# Patient Record
Sex: FEMALE | Race: WHITE | NOT HISPANIC OR LATINO | Employment: OTHER | URBAN - METROPOLITAN AREA
[De-identification: names, ages, dates, MRNs, and addresses within clinical notes are randomized per-mention and may not be internally consistent; named-entity substitution may affect disease eponyms.]

---

## 2017-01-13 ENCOUNTER — TELEPHONE (OUTPATIENT)
Dept: RHEUMATOLOGY | Facility: CLINIC | Age: 77
End: 2017-01-13

## 2017-01-13 NOTE — TELEPHONE ENCOUNTER
Returned patient's call. Rescheduled follow up for march 2017. Patient was supposed to be scheduled for 3 month follow up

## 2017-01-13 NOTE — TELEPHONE ENCOUNTER
----- Message from Samantha Ontiveros sent at 1/13/2017 12:34 PM CST -----  Contact: Patient  Patient was understand why her appointment wasn't scheduled for March. Please call patient at [phone] with a sooner appointment than May.

## 2017-05-30 ENCOUNTER — OFFICE VISIT (OUTPATIENT)
Dept: PODIATRY | Facility: CLINIC | Age: 77
End: 2017-05-30
Payer: MEDICARE

## 2017-05-30 VITALS — HEIGHT: 66 IN | BODY MASS INDEX: 32.92 KG/M2 | WEIGHT: 204.81 LBS

## 2017-05-30 DIAGNOSIS — M20.41 HAMMER TOES OF BOTH FEET: ICD-10-CM

## 2017-05-30 DIAGNOSIS — E11.49 TYPE II DIABETES MELLITUS WITH NEUROLOGICAL MANIFESTATIONS: Primary | ICD-10-CM

## 2017-05-30 DIAGNOSIS — L85.3 XEROSIS OF SKIN: ICD-10-CM

## 2017-05-30 DIAGNOSIS — L84 PRE-ULCERATIVE CALLUSES: ICD-10-CM

## 2017-05-30 DIAGNOSIS — M21.42 PES PLANUS OF BOTH FEET: ICD-10-CM

## 2017-05-30 DIAGNOSIS — M20.42 HAMMER TOES OF BOTH FEET: ICD-10-CM

## 2017-05-30 DIAGNOSIS — M79.671 FOOT PAIN, RIGHT: ICD-10-CM

## 2017-05-30 DIAGNOSIS — M21.41 PES PLANUS OF BOTH FEET: ICD-10-CM

## 2017-05-30 PROCEDURE — 99499 UNLISTED E&M SERVICE: CPT | Mod: S$GLB,,, | Performed by: PODIATRIST

## 2017-05-30 PROCEDURE — 99999 PR PBB SHADOW E&M-EST. PATIENT-LVL III: CPT | Mod: PBBFAC,,, | Performed by: PODIATRIST

## 2017-05-30 RX ORDER — BUTALBITAL, ACETAMINOPHEN AND CAFFEINE 50; 325; 40 MG/1; MG/1; MG/1
1 TABLET ORAL DAILY PRN
COMMUNITY
End: 2017-10-17

## 2017-05-30 NOTE — PROGRESS NOTES
Subjective:      Patient ID: Johanny Hull is a 76 y.o. female.    Chief Complaint: Heel Pain (left foot); Callouses (right foot 2nd-3rd toes/ Hammer toes); and Diabetes Mellitus (Shari-3/17   A1C- unknown per patient)    Johanny is a 76 y.o. female who presents to the clinic for routine evaluation and treatment of diabetic feet. Johanny has a past medical history of Arthritis; Cataract; Diabetes mellitus; GERD (gastroesophageal reflux disease); Hyperlipidemia; Hypertension; Pacemaker; and Thyroid disease. Patient's chief complaint concerns calluses to the Rt. 2nd and 3rd toes.  States that they have been present since our last encounter.  Relates emitting sharp pain and has been concerned by their potential to ulcerate.  Currently rates pain as a 7/10.  Symptoms are exacerbated with wearing closed toe shoes.  Continues to wear a pair of leather Elvi Kevin with a shallow toe box.  Symptoms are alleviated with shoe removal.  Denies any additional pedal complaints.      PCP: Isaac Mccarthy MD    Date Last Seen by PCP: 3/17    No results found for: HGBA1C    Past Medical History:   Diagnosis Date    Arthritis     Cataract     Diabetes mellitus     GERD (gastroesophageal reflux disease)     Hyperlipidemia     Hypertension     Pacemaker     Thyroid disease        Past Surgical History:   Procedure Laterality Date    APPENDECTOMY      CARDIAC PACEMAKER PLACEMENT      10 /2016    CATARACT EXTRACTION      CERVICAL FUSION      CHOLECYSTECTOMY      CORONARY STENT PLACEMENT  x3    may 2015, aug 2015    EYE SURGERY      RIGHT    FOOT SURGERY      x 2 LEFT    HYSTERECTOMY      VAGINAL    JOINT REPLACEMENT      R knee, L knee, R hip    LAMINECTOMY LUMBAR SPLINE W/ PLACEMENT SPINAL CORD STIMULATOR      LUMBAR LAMINECTOMY      x 6    SHOULDER SURGERY      ROTOR CUFF SU    SMALL INTESTINE SURGERY      3 blockages, adhesions       Family History   Problem Relation Age of Onset    Cancer Mother      LIVER     Stroke Father        Social History     Social History    Marital status:      Spouse name: N/A    Number of children: N/A    Years of education: N/A     Social History Main Topics    Smoking status: Never Smoker    Smokeless tobacco: None    Alcohol use No    Drug use: No    Sexual activity: Not Currently     Other Topics Concern    None     Social History Narrative    None       Current Outpatient Prescriptions   Medication Sig Dispense Refill    acetaminophen (TYLENOL) 500 MG tablet Take 500 mg by mouth every 6 (six) hours as needed.        amitriptyline (ELAVIL) 10 MG tablet Take 1 tablet (10 mg total) by mouth every evening. 30 tablet 11    butalbital-acetaminop-caf-cod -11-30 mg Cap Take 1 tablet by mouth 3 (three) times daily as needed. 60 capsule 3    butalbital-acetaminophen-caffeine -40 mg (FIORICET, ESGIC) -40 mg per tablet Take by mouth.      dicyclomine (BENTYL) 10 MG capsule Take 10 mg by mouth 4 (four) times daily before meals and nightly.        folic acid (FOLVITE) 1 MG tablet Take 1 mg by mouth once daily.        gabapentin (NEURONTIN) 300 MG capsule       lansoprazole (PREVACID) 30 MG capsule Take 30 mg by mouth once daily.        levothyroxine (SYNTHROID) 25 MCG tablet Take 25 mcg by mouth once daily.        metformin (GLUCOPHAGE) 500 MG tablet       metoprolol succinate (TOPROL-XL) 25 MG 24 hr tablet Take 25 mg by mouth once daily.        omeprazole (PRILOSEC) 20 MG capsule Take 20 mg by mouth once daily.      pravastatin (PRAVACHOL) 10 MG tablet Take 10 mg by mouth once daily.        predniSONE (DELTASONE) 2.5 MG tablet Take 2 tablets (5 mg total) by mouth once daily. 60 tablet 4    ranitidine (ZANTAC) 75 MG tablet Take 75 mg by mouth 2 (two) times daily.        rivaroxaban (XARELTO) 20 mg Tab Take 20 mg by mouth daily with dinner or evening meal.      tramadol (ULTRAM) 50 mg tablet Take 1 tablet (50 mg total) by mouth 3 (three) times  daily as needed for Pain. 90 tablet 3    acetaminophen-codeine 300-60mg (TYLENOL #4) 300-60 mg Tab TK 1 T PO  Q 8 H PRN  0     No current facility-administered medications for this visit.        Review of patient's allergies indicates:   Allergen Reactions    Cephalosporins     Ciprofloxacin     Cleocin [clindamycin hcl]     Darvon [propoxyphene]     Demerol [meperidine]     Doxycycline     Erythromycin     Macrodantin [nitrofurantoin macrocrystalline]     Norco [hydrocodone-acetaminophen]     Other omega-3s Nausea And Vomiting     Cepabid    Percocet [oxycodone-acetaminophen]     Stadol [butorphanol tartrate]     Sulfa (sulfonamide antibiotics)     Tigan [trimethobenzamide]          Review of Systems   Constitution: Negative for chills and fever.   Cardiovascular: Negative for claudication.   Skin: Positive for color change, dry skin and nail changes.   Musculoskeletal: Positive for arthritis, joint pain and joint swelling.   Neurological: Positive for paresthesias. Negative for numbness.   Psychiatric/Behavioral: Negative for altered mental status.           Objective:      Physical Exam   Constitutional: She is oriented to person, place, and time. She appears well-developed and well-nourished. No distress.   Cardiovascular:   Pulses:       Dorsalis pedis pulses are 2+ on the right side, and 2+ on the left side.        Posterior tibial pulses are 1+ on the right side, and 1+ on the left side.   CFT <3 seconds bilateral.  Pedal hair growth decreased bilateral.  Varicosities noted bilateral.  1+ pitting edema bilateral.  Toes are cool to touch bilateral.     Musculoskeletal: She exhibits edema. She exhibits no tenderness.   Muscle strength 5/5 in all muscle groups bilateral.  No tenderness nor crepitation with ROM of foot/ankle joints bilateral.  No pain with palpation of bilateral foot and ankle.  Bilateral gastrocnemius equinus.  Bilateral pes planus foot type.  Bilateral hallux abducto valgus.   Bilateral semi-rigid contracture of toes 2-5.     Neurological: She is alert and oriented to person, place, and time. She has normal strength. A sensory deficit is present.   Protective sensation per [place] monofilament intact bilateral.    Vibratory sensation decreased bilateral.    Light touch intact bilateral.   Skin: Skin is warm, dry and intact. No abrasion, no bruising, no burn, no ecchymosis, no laceration, no lesion, no petechiae and no rash noted. She is not diaphoretic. No cyanosis or erythema. No pallor. Nails show no clubbing.   Xerosis noted to the Lt. Medial and posterior heel.  Pedal skin appears edematous bilateral. Toenails x 10 appear mycotic but well maintained.   Pre-ulcerative callus noted to the Rt. 2nd PIP joint dorsally. Taunt bulla noted to the Rt. 3rd PIP joint.  No adjacent signs of infection noted.  Examination of the skin reveals no evidence of significant maceration, rashes, open lesions, suspicious appearing nevi or other concerning lesions.              Assessment:       Encounter Diagnoses   Name Primary?    Type II diabetes mellitus with neurological manifestations Yes    Foot pain, right     Pes planus of both feet     Hammer toes of both feet     Xerosis of skin     Pre-ulcerative calluses          Plan:       Johanny was seen today for heel pain, callouses and diabetes mellitus.    Diagnoses and all orders for this visit:    Type II diabetes mellitus with neurological manifestations  -     DIABETIC SHOES FOR HOME USE    Foot pain, right  -     DIABETIC SHOES FOR HOME USE    Pes planus of both feet  -     DIABETIC SHOES FOR HOME USE    Hammer toes of both feet  -     DIABETIC SHOES FOR HOME USE    Xerosis of skin    Pre-ulcerative calluses  -     DIABETIC SHOES FOR HOME USE      I counseled the patient on her conditions, their implications and medical management.    Explained to patient that current irritation to the toes is in fact solely due to wearing shoes with  an inappropriate fitting toe box.  Advised to wear more of an open toe shoe until new diabetic shoe prescription is filled.    Prescription written for diabetic shoes to offload digital deformities.  Recommend that new shoes have a toe box comprised of Lycra to reduce irritation to the dorsum of contracted digits.    Advised to use Excipial cream with 20% urea to address xerosis and to reduce callus build up.      Shoe inspection. Diabetic Foot Education. Patient reminded of the importance of good nutrition and blood sugar control to help prevent podiatric complications of diabetes. Patient instructed on proper foot hygeine. We discussed wearing proper shoe gear, daily foot inspections, never walking without protective shoe gear, never putting sharp instruments to feet    With the patient's verbal consent, a sterile #15 scalpel was used to trim the lesion of the Rt. 2nd toe down to smooth appearing skin without incident.  Patient tolerated this quite well.  Patient instructed to inspect her feet, wear protective shoe gear when ambulatory, moisturizer to maintain skin integrity and follow in this office in approximately 2-3 months, sooner p.r.n.    Return in about 3 months (around 8/30/2017).    Skinny Pierre DPM

## 2017-06-02 ENCOUNTER — TELEPHONE (OUTPATIENT)
Dept: PODIATRY | Facility: CLINIC | Age: 77
End: 2017-06-02

## 2017-06-02 NOTE — TELEPHONE ENCOUNTER
Spoke with patient, /  out, Patient states she will go to PCP to have her toe checked, will let office know how things go.

## 2017-06-02 NOTE — TELEPHONE ENCOUNTER
----- Message from Rick Harrison sent at 6/2/2017  8:06 AM CDT -----  Contact: Patient  Patient states that she had her second toe on the right foot trimmed and now it's yellow and red, but it's not bleeding.  Please call [phone].  Thank you

## 2017-06-06 ENCOUNTER — HOSPITAL ENCOUNTER (OUTPATIENT)
Dept: RADIOLOGY | Facility: HOSPITAL | Age: 77
Discharge: HOME | End: 2017-06-06
Attending: PODIATRIST
Payer: MEDICARE

## 2017-06-06 ENCOUNTER — OFFICE VISIT (OUTPATIENT)
Dept: PODIATRY | Facility: CLINIC | Age: 77
End: 2017-06-06
Payer: MEDICARE

## 2017-06-06 VITALS — WEIGHT: 204 LBS | HEIGHT: 66 IN | BODY MASS INDEX: 32.78 KG/M2

## 2017-06-06 DIAGNOSIS — E11.49 TYPE II DIABETES MELLITUS WITH NEUROLOGICAL MANIFESTATIONS: ICD-10-CM

## 2017-06-06 DIAGNOSIS — L97.512 ULCER OF GREAT TOE, RIGHT, WITH FAT LAYER EXPOSED: ICD-10-CM

## 2017-06-06 DIAGNOSIS — L97.512 ULCER OF GREAT TOE, RIGHT, WITH FAT LAYER EXPOSED: Primary | ICD-10-CM

## 2017-06-06 PROCEDURE — 99213 OFFICE O/P EST LOW 20 MIN: CPT | Mod: 25,S$GLB,, | Performed by: PODIATRIST

## 2017-06-06 PROCEDURE — 99999 PR PBB SHADOW E&M-EST. PATIENT-LVL III: CPT | Mod: PBBFAC,,, | Performed by: PODIATRIST

## 2017-06-06 PROCEDURE — 1159F MED LIST DOCD IN RCRD: CPT | Mod: S$GLB,,, | Performed by: PODIATRIST

## 2017-06-06 PROCEDURE — 73630 X-RAY EXAM OF FOOT: CPT | Mod: 26,RT,, | Performed by: RADIOLOGY

## 2017-06-06 PROCEDURE — 11042 DBRDMT SUBQ TIS 1ST 20SQCM/<: CPT | Mod: S$GLB,,, | Performed by: PODIATRIST

## 2017-06-06 PROCEDURE — 1125F AMNT PAIN NOTED PAIN PRSNT: CPT | Mod: S$GLB,,, | Performed by: PODIATRIST

## 2017-06-06 PROCEDURE — 73630 X-RAY EXAM OF FOOT: CPT | Mod: TC,PO,RT

## 2017-06-06 RX ORDER — BACITRACIN 500 [USP'U]/G
OINTMENT OPHTHALMIC
COMMUNITY
Start: 2017-06-02 | End: 2017-10-17

## 2017-06-06 RX ORDER — LEVOFLOXACIN 500 MG/1
TABLET, FILM COATED ORAL
COMMUNITY
Start: 2017-06-02 | End: 2017-10-17

## 2017-06-06 NOTE — PROGRESS NOTES
Subjective:      Patient ID: Johanny Hull is a 76 y.o. female.    Chief Complaint: Foot Pain (right foot, 2nd toe)    Wound 2nd toe right with cellulitis tx by Dr. Mccarthy.  Gradual onset, worsening over past several days, aggravated by increased weight bearing, shoe gear, pressure.  Taking levaquin with improvement past several days.  Denies trauma.    Review of Systems   Constitution: Negative for chills, fever, malaise/fatigue and night sweats.   Cardiovascular: Negative for claudication, cyanosis and leg swelling.   Skin: Positive for poor wound healing. Negative for color change, itching, rash and suspicious lesions.   Musculoskeletal: Negative for falls, gout, joint pain and myalgias.   Neurological: Positive for numbness and sensory change.           Objective:      Physical Exam   Constitutional: She is oriented to person, place, and time. She appears well-developed and well-nourished. She is cooperative.   Oriented to time, place, and person.   Cardiovascular:   Pulses:       Dorsalis pedis pulses are 1+ on the right side, and 1+ on the left side.        Posterior tibial pulses are 1+ on the right side, and 1+ on the left side.   Capillary fill time 3-5 seconds.  All toes warm to touch.      Negative lower extremity edema bilateral.    Negative elevational pallor and dependent rubor bilateral.     Musculoskeletal:   Normal angle, base, station of gait. Decreased stride length, early heel off, moderately propulsive toe off bilateral.    All ten toes without clubbing, cyanosis, or signs of ischemia.      Patient has hammertoes of digits   2-5 bilateral           not reducible.      No pain to palpation bilateral lower extremities.      Range of motion, stability, muscle strength, and muscle tone are age and health appropriate normal bilateral feet and legs.       Lymphadenopathy:   Negative lymphadenopathy bilateral popliteal fossa and tarsal tunnel.     Neurological: She is alert and oriented to person,  place, and time. She has normal strength. She is not disoriented. She displays no atrophy and no tremor. A sensory deficit is present. She exhibits normal muscle tone.   Reflex Scores:       Patellar reflexes are 2+ on the right side and 2+ on the left side.       Achilles reflexes are 2+ on the right side and 2+ on the left side.  Decreased/absent vibratory sensation bilateral feet to 128Hz tuning fork.    Diminished/loss of protective sensation all toes bilateral to 10 gram monofilament.     Skin: Skin is warm, dry and intact. No abrasion, no bruising, no burn, no ecchymosis, no laceration, no lesion, no petechiae and no rash noted. She is not diaphoretic. No cyanosis or erythema. No pallor. Nails show no clubbing.   Wound: dorsal pipj2 right    Size:    Pre:[id number]  Fluctuant callus  Post: [id number]    Base:  Granular, pink with moderate serous/serosanaguinous drainage only.  No pus, tracking, fluctuance, malodor, or cardinal signs infection.    Borders:  Hyperkeratotic, debriding to flat, pink, blanchable skin edges without undermining.          Skin thin, atrophic, with decreased density and distribution of pedal hair bilateral, but without hyperpigmentation, sherry discoloration,  ulcers, masses, nodules or cords palpated bilateral feet and legs.                   Assessment:       Encounter Diagnoses   Name Primary?    Ulcer of great toe, right, with fat layer exposed Yes    Type II diabetes mellitus with neurological manifestations          Plan:       Johanny was seen today for foot pain.    Diagnoses and all orders for this visit:    Ulcer of great toe, right, with fat layer exposed  -     X-Ray Foot Complete Right; Future  -     Aerobic culture  -     Culture, Anaerobic    Type II diabetes mellitus with neurological manifestations  -     X-Ray Foot Complete Right; Future  -     Aerobic culture  -     Culture, Anaerobic      I counseled the patient on her conditions, their implications and medical  management.        Debrided wound.    Dressed with wound foam, kerlix - change same every other day.    Dispensed sx shoe - ambulate minimally sx shoe right, DM shoes/inserts left.    Continue levaquin.    Cultures, xrays.          Return in about 1 week (around 6/13/2017) for wound 2nd toeright.

## 2017-06-06 NOTE — LETTER
June 6, 2017      Isaac Mccarthy MD  [address]  [facility] [address]           [facility]  [address] [facility] [address] [place] [address]  Phone: [phone]          Patient: Johanny Hull   MR Number: [id number]   YOB: 1940   Date of Visit: 6/6/2017       Dear Dr. Isaac Mccarthy:    Thank you for referring Johanny Hull to me for evaluation. Attached you will find relevant portions of my assessment and plan of care.    If you have questions, please do not hesitate to call me. I look forward to following Johanny Hull along with you.    Sincerely,    Jason Castanon, JENNIFER    Enclosure  CC:  No Recipients    If you would like to receive this communication electronically, please contact [email or web address] or [phone] to request more information on [employer] access.    For providers and/or their staff who would like to refer a patient to [facility], please contact us through our one-stop-shop provider referral line, [facility] , at [phone].    If you feel you have received this communication in error or would no longer like to receive these types of communications, please e-mail [email or web address]

## 2017-06-09 LAB — BACTERIA SPEC AEROBE CULT: NO GROWTH

## 2017-06-12 ENCOUNTER — OFFICE VISIT (OUTPATIENT)
Dept: PODIATRY | Facility: CLINIC | Age: 77
End: 2017-06-12
Payer: MEDICARE

## 2017-06-12 VITALS — HEIGHT: 66 IN | BODY MASS INDEX: 32.78 KG/M2 | WEIGHT: 203.94 LBS

## 2017-06-12 DIAGNOSIS — E11.49 TYPE II DIABETES MELLITUS WITH NEUROLOGICAL MANIFESTATIONS: ICD-10-CM

## 2017-06-12 DIAGNOSIS — M20.41 HAMMER TOES OF BOTH FEET: ICD-10-CM

## 2017-06-12 DIAGNOSIS — M20.42 HAMMER TOES OF BOTH FEET: ICD-10-CM

## 2017-06-12 DIAGNOSIS — Z87.2 HEALED ULCER OF RIGHT FOOT ON EXAMINATION: Primary | ICD-10-CM

## 2017-06-12 LAB — BACTERIA SPEC ANAEROBE CULT: NORMAL

## 2017-06-12 PROCEDURE — 99499 UNLISTED E&M SERVICE: CPT | Mod: S$GLB,,, | Performed by: PODIATRIST

## 2017-06-12 PROCEDURE — 99999 PR PBB SHADOW E&M-EST. PATIENT-LVL III: CPT | Mod: PBBFAC,,, | Performed by: PODIATRIST

## 2017-06-13 NOTE — PROGRESS NOTES
Subjective:      Patient ID: Johanny Hull is a 77 y.o. female.    Chief Complaint: Wound Care    Patient presents to clinic for a 1 week wound check of the Rt. Foot.  States the Rt. 2nd toe appears to be healed with today's exam.  Has been keeping the toe exposed to air.   Relates keeping the site offloaded with wearing sandals.  Denies being painful on exam.  Denies any additional pedal complaints.         Past Medical History:   Diagnosis Date    Arthritis     Cataract     Diabetes mellitus     GERD (gastroesophageal reflux disease)     Hyperlipidemia     Hypertension     Pacemaker     Thyroid disease        Past Surgical History:   Procedure Laterality Date    APPENDECTOMY      CARDIAC PACEMAKER PLACEMENT      10 /2016    CATARACT EXTRACTION      CERVICAL FUSION      CHOLECYSTECTOMY      CORONARY STENT PLACEMENT  x3    may 2015, aug 2015    EYE SURGERY      RIGHT    FOOT SURGERY      x 2 LEFT    HYSTERECTOMY      VAGINAL    JOINT REPLACEMENT      R knee, L knee, R hip    LAMINECTOMY LUMBAR SPLINE W/ PLACEMENT SPINAL CORD STIMULATOR      LUMBAR LAMINECTOMY      x 6    SHOULDER SURGERY      ROTOR CUFF SU    SMALL INTESTINE SURGERY      3 blockages, adhesions       Family History   Problem Relation Age of Onset    Cancer Mother      LIVER    Stroke Father        Social History     Social History    Marital status:      Spouse name: N/A    Number of children: N/A    Years of education: N/A     Social History Main Topics    Smoking status: Never Smoker    Smokeless tobacco: None    Alcohol use No    Drug use: No    Sexual activity: Not Currently     Other Topics Concern    None     Social History Narrative    None       Current Outpatient Prescriptions   Medication Sig Dispense Refill    acetaminophen (TYLENOL) 500 MG tablet Take 500 mg by mouth every 6 (six) hours as needed.        acetaminophen-codeine 300-60mg (TYLENOL #4) 300-60 mg Tab TK 1 T PO  Q 8 H PRN  0     amitriptyline (ELAVIL) 10 MG tablet Take 1 tablet (10 mg total) by mouth every evening. 30 tablet 11    bacitracin (AK-TRACIN) ophthalmic ointment       butalbital-acetaminop-caf-cod -00-30 mg Cap Take 1 tablet by mouth 3 (three) times daily as needed. 60 capsule 3    butalbital-acetaminophen-caffeine -40 mg (FIORICET, ESGIC) -40 mg per tablet Take by mouth.      dicyclomine (BENTYL) 10 MG capsule Take 10 mg by mouth 4 (four) times daily before meals and nightly.        folic acid (FOLVITE) 1 MG tablet Take 1 mg by mouth once daily.        gabapentin (NEURONTIN) 300 MG capsule       lansoprazole (PREVACID) 30 MG capsule Take 30 mg by mouth once daily.        levoFLOXacin (LEVAQUIN) 500 MG tablet       levothyroxine (SYNTHROID) 25 MCG tablet Take 25 mcg by mouth once daily.        metformin (GLUCOPHAGE) 500 MG tablet       metoprolol succinate (TOPROL-XL) 25 MG 24 hr tablet Take 25 mg by mouth once daily.        omeprazole (PRILOSEC) 20 MG capsule Take 20 mg by mouth once daily.      pravastatin (PRAVACHOL) 10 MG tablet Take 10 mg by mouth once daily.        predniSONE (DELTASONE) 2.5 MG tablet Take 2 tablets (5 mg total) by mouth once daily. 60 tablet 4    ranitidine (ZANTAC) 75 MG tablet Take 75 mg by mouth 2 (two) times daily.        rivaroxaban (XARELTO) 20 mg Tab Take 20 mg by mouth daily with dinner or evening meal.      tramadol (ULTRAM) 50 mg tablet Take 1 tablet (50 mg total) by mouth 3 (three) times daily as needed for Pain. 90 tablet 3     No current facility-administered medications for this visit.        Review of patient's allergies indicates:   Allergen Reactions    Cephalosporins     Ciprofloxacin     Cleocin [clindamycin hcl]     Darvon [propoxyphene]     Demerol [meperidine]     Doxycycline     Erythromycin     Macrodantin [nitrofurantoin macrocrystalline]     Norco [hydrocodone-acetaminophen]     Other omega-3s Nausea And Vomiting     Cepabid     Percocet [oxycodone-acetaminophen]     Stadol [butorphanol tartrate]     Sulfa (sulfonamide antibiotics)     Tigan [trimethobenzamide]          Review of Systems   Constitution: Negative for chills and fever.   Cardiovascular: Negative for claudication.   Skin: Positive for color change, dry skin and nail changes.   Musculoskeletal: Positive for arthritis, joint pain and joint swelling.   Neurological: Positive for paresthesias. Negative for numbness.   Psychiatric/Behavioral: Negative for altered mental status.           Objective:      Physical Exam   Constitutional: She is oriented to person, place, and time. She appears well-developed and well-nourished. No distress.   Cardiovascular:   Pulses:       Dorsalis pedis pulses are 2+ on the right side, and 2+ on the left side.        Posterior tibial pulses are 1+ on the right side, and 1+ on the left side.   CFT <3 seconds bilateral.  Pedal hair growth decreased bilateral.  Varicosities noted bilateral.  1+ pitting edema bilateral.  Toes are cool to touch bilateral.     Musculoskeletal: She exhibits edema. She exhibits no tenderness.   Muscle strength 5/5 in all muscle groups bilateral.  No tenderness nor crepitation with ROM of foot/ankle joints bilateral.  No pain with palpation of bilateral foot and ankle.  Bilateral gastrocnemius equinus.  Bilateral pes planus foot type.  Bilateral hallux abducto valgus.  Bilateral semi-rigid contracture of toes 2-5.     Neurological: She is alert and oriented to person, place, and time. She has normal strength. A sensory deficit is present.   Protective sensation per [place] monofilament intact bilateral.    Vibratory sensation decreased bilateral.    Light touch intact bilateral.   Skin: Skin is warm, dry and intact. No abrasion, no bruising, no burn, no ecchymosis, no laceration, no lesion, no petechiae and no rash noted. She is not diaphoretic. No cyanosis or erythema. No pallor. Nails show no clubbing.   Xerosis  noted to the Lt. Medial and posterior heel.  Pedal skin appears edematous bilateral. Toenails x 10 appear mycotic but well maintained.   Resolved bulla overlying the Rt. 3rd PIP joint and mature epithelium noted to the Rt. 2nd PIP joint.  No adjacent signs of infection noted.               Assessment:       Encounter Diagnoses   Name Primary?    Healed ulcer of right foot on examination Yes    Hammer toes of both feet     Type II diabetes mellitus with neurological manifestations          Plan:       Johanny was seen today for wound care.    Diagnoses and all orders for this visit:    Healed ulcer of right foot on examination    Hammer toes of both feet    Type II diabetes mellitus with neurological manifestations      I counseled the patient on her conditions, their implications and medical management.    The previous Rt. 2nd toe wound has successfully healed.  Advised to cushion the joint with a padded bandage while waiting to receive diabetic shoes.    Patient to fill previous Rx for diabetic shoes.  Recommend that new shoes have a toe box comprised of Lycra to reduce irritation to the dorsum of contracted digits.    Advised to use Excipial cream with 20% urea to address xerosis and to reduce rate of callus build up.      Return in about 3 months (around 9/12/2017).    Skinny Pierre DPM

## 2017-07-28 DIAGNOSIS — M47.817 LUMBAR AND SACRAL ARTHRITIS: ICD-10-CM

## 2017-07-28 DIAGNOSIS — M48.061 SPINAL STENOSIS AT L4-L5 LEVEL: ICD-10-CM

## 2017-07-28 DIAGNOSIS — M54.2 CERVICALGIA: ICD-10-CM

## 2017-07-28 DIAGNOSIS — G43.909 MIGRAINE WITHOUT STATUS MIGRAINOSUS, NOT INTRACTABLE, UNSPECIFIED MIGRAINE TYPE: ICD-10-CM

## 2017-07-28 DIAGNOSIS — M79.7 FIBROMYALGIA: ICD-10-CM

## 2017-07-28 DIAGNOSIS — M54.6 PAIN IN THORACIC SPINE: ICD-10-CM

## 2017-07-31 RX ORDER — PREDNISONE 2.5 MG/1
TABLET ORAL
Qty: 60 TABLET | Refills: 0 | Status: SHIPPED | OUTPATIENT
Start: 2017-07-31 | End: 2017-10-05 | Stop reason: SDUPTHER

## 2017-10-05 DIAGNOSIS — M48.061 SPINAL STENOSIS AT L4-L5 LEVEL: ICD-10-CM

## 2017-10-05 DIAGNOSIS — M47.817 LUMBAR AND SACRAL ARTHRITIS: ICD-10-CM

## 2017-10-05 DIAGNOSIS — G43.909 MIGRAINE WITHOUT STATUS MIGRAINOSUS, NOT INTRACTABLE, UNSPECIFIED MIGRAINE TYPE: ICD-10-CM

## 2017-10-05 DIAGNOSIS — M79.7 FIBROMYALGIA: ICD-10-CM

## 2017-10-05 DIAGNOSIS — M54.6 PAIN IN THORACIC SPINE: ICD-10-CM

## 2017-10-05 DIAGNOSIS — M54.2 CERVICALGIA: ICD-10-CM

## 2017-10-05 RX ORDER — PREDNISONE 2.5 MG/1
TABLET ORAL
Qty: 60 TABLET | Refills: 3 | Status: SHIPPED | OUTPATIENT
Start: 2017-10-05 | End: 2018-03-24 | Stop reason: SDUPTHER

## 2017-10-17 ENCOUNTER — OFFICE VISIT (OUTPATIENT)
Dept: ORTHOPEDICS | Facility: CLINIC | Age: 77
End: 2017-10-17
Payer: MEDICARE

## 2017-10-17 VITALS
HEIGHT: 66 IN | HEART RATE: 81 BPM | SYSTOLIC BLOOD PRESSURE: 138 MMHG | WEIGHT: 205 LBS | DIASTOLIC BLOOD PRESSURE: 76 MMHG | BODY MASS INDEX: 32.95 KG/M2

## 2017-10-17 DIAGNOSIS — M75.51 SUBDELTOID BURSITIS OF RIGHT SHOULDER JOINT: Primary | ICD-10-CM

## 2017-10-17 PROCEDURE — 20610 DRAIN/INJ JOINT/BURSA W/O US: CPT | Mod: RT,,, | Performed by: ORTHOPAEDIC SURGERY

## 2017-10-17 PROCEDURE — 99213 OFFICE O/P EST LOW 20 MIN: CPT | Mod: 25,,, | Performed by: ORTHOPAEDIC SURGERY

## 2017-10-17 RX ORDER — TRIAMCINOLONE ACETONIDE 40 MG/ML
80 INJECTION, SUSPENSION INTRA-ARTICULAR; INTRAMUSCULAR
Status: DISCONTINUED | OUTPATIENT
Start: 2017-10-17 | End: 2017-10-17 | Stop reason: HOSPADM

## 2017-10-17 RX ORDER — ONDANSETRON 4 MG/1
1 TABLET, FILM COATED ORAL DAILY PRN
COMMUNITY
Start: 2017-08-24 | End: 2018-08-30

## 2017-10-17 RX ORDER — PANTOPRAZOLE SODIUM 40 MG/1
1 TABLET, DELAYED RELEASE ORAL DAILY
COMMUNITY
Start: 2017-08-31 | End: 2017-10-17

## 2017-10-17 RX ORDER — MUPIROCIN 20 MG/G
OINTMENT TOPICAL
COMMUNITY
Start: 2017-10-04 | End: 2018-08-30

## 2017-10-17 RX ORDER — MIDODRINE HYDROCHLORIDE 2.5 MG/1
1 TABLET ORAL 2 TIMES DAILY
COMMUNITY
Start: 2017-09-06 | End: 2017-10-17

## 2017-10-17 RX ORDER — NORTRIPTYLINE HYDROCHLORIDE 10 MG/1
10 CAPSULE ORAL NIGHTLY
COMMUNITY
End: 2018-08-30

## 2017-10-17 RX ORDER — PANTOPRAZOLE SODIUM 40 MG/1
40 TABLET, DELAYED RELEASE ORAL DAILY
COMMUNITY

## 2017-10-17 RX ORDER — DICYCLOMINE HYDROCHLORIDE 20 MG/1
1 TABLET ORAL 3 TIMES DAILY
COMMUNITY
Start: 2017-10-16

## 2017-10-17 RX ADMIN — TRIAMCINOLONE ACETONIDE 80 MG: 40 INJECTION, SUSPENSION INTRA-ARTICULAR; INTRAMUSCULAR at 05:10

## 2017-10-17 NOTE — PROGRESS NOTES
Subjective:       Chief Complaint    Chief Complaint   Patient presents with    Follow-up     right shoulder.  DOI: 2/2017 due to falling in a parking lot.  Patient is having pain and achy feeling.  She has a torn rotator cuff that has not been repaired.  Requesting an injection.       HPI  Johanny Hull is a 77 y.o.  female who presentsWith three-week history of progressive pain in the right shoulder. Only relief she gets  is with the injection, which is temporary.       Past History  Past Medical History:   Diagnosis Date    Arthritis     Cataract     Diabetes mellitus     Diabetes mellitus, type 2     GERD (gastroesophageal reflux disease)     Hyperlipidemia     Hypertension     Pacemaker     Severe low back pain     Subdeltoid bursitis of right shoulder joint     Thyroid disease      Past Surgical History:   Procedure Laterality Date    APPENDECTOMY      CARDIAC PACEMAKER PLACEMENT      10 /2016    CATARACT EXTRACTION      CERVICAL FUSION      CHOLECYSTECTOMY      CORONARY STENT PLACEMENT  x3    may 2015, aug 2015    EYE SURGERY      RIGHT    FOOT SURGERY      x 2 LEFT    HYSTERECTOMY      VAGINAL    JOINT REPLACEMENT      R knee, L knee, R hip    LAMINECTOMY LUMBAR SPLINE W/ PLACEMENT SPINAL CORD STIMULATOR      LUMBAR LAMINECTOMY      x 6    SHOULDER SURGERY      ROTOR CUFF SU    SMALL INTESTINE SURGERY      3 blockages, adhesions    TOTAL HIP ARTHROPLASTY Right     TOTAL KNEE ARTHROPLASTY Bilateral      Social History     Social History    Marital status:      Spouse name: N/A    Number of children: N/A    Years of education: N/A     Occupational History    Not on file.     Social History Main Topics    Smoking status: Never Smoker    Smokeless tobacco: Never Used    Alcohol use No    Drug use: No    Sexual activity: Not Currently     Other Topics Concern    Not on file     Social History Narrative    No narrative on file         Medications  Current Outpatient  Prescriptions   Medication Sig    acetaminophen (TYLENOL) 500 MG tablet Take 500 mg by mouth every 6 (six) hours as needed.      acetaminophen-codeine 300-60mg (TYLENOL #4) 300-60 mg Tab TK 1 T PO  Q 8 H PRN    butalbital-acetaminop-caf-cod -55-30 mg Cap Take 1 tablet by mouth 3 (three) times daily as needed.    dicyclomine (BENTYL) 20 mg tablet Take 1 tablet by mouth 2 (two) times daily.    diphenhydramine-acetaminophen 12.5-325 mg Tab Take 1 tablet by mouth daily as needed.    folic acid (FOLVITE) 1 MG tablet Take 1 mg by mouth once daily.      lansoprazole (PREVACID) 30 MG capsule Take 30 mg by mouth once daily.      levothyroxine (SYNTHROID) 25 MCG tablet Take 25 mcg by mouth once daily.      metformin (GLUCOPHAGE) 500 MG tablet Take 500 mg by mouth daily with breakfast.     metoprolol succinate (TOPROL-XL) 25 MG 24 hr tablet Take 25 mg by mouth once daily.      mupirocin (BACTROBAN) 2 % ointment As directed    nortriptyline (PAMELOR) 10 MG capsule Take 10 mg by mouth every evening.    ondansetron (ZOFRAN) 4 MG tablet Take 1 tablet by mouth daily as needed.    pantoprazole (PROTONIX) 40 MG tablet Take 40 mg by mouth once daily.    pravastatin (PRAVACHOL) 10 MG tablet Take 10 mg by mouth once daily.      predniSONE (DELTASONE) 2.5 MG tablet TAKE 2 TABLETS(5 MG) BY MOUTH EVERY DAY    ranitidine (ZANTAC) 75 MG tablet Take 75 mg by mouth 2 (two) times daily.      rivaroxaban (XARELTO) 20 mg Tab Take 20 mg by mouth daily with dinner or evening meal.    tramadol (ULTRAM) 50 mg tablet Take 1 tablet (50 mg total) by mouth 3 (three) times daily as needed for Pain.     No current facility-administered medications for this visit.        Allergies  Review of patient's allergies indicates:   Allergen Reactions    Cephalosporins     Ciprofloxacin     Cleocin [clindamycin hcl]     Clindamycin     Darvon [propoxyphene]     Demerol [meperidine]     Doxycycline     Erythromycin     Macrodantin  [nitrofurantoin macrocrystalline]     Nitrofurantoin     Norco [hydrocodone-acetaminophen]     Other omega-3s Nausea And Vomiting     Cephabid (ABX)  Cepabid    Percocet [oxycodone-acetaminophen]     Stadol [butorphanol tartrate]     Sulfa (sulfonamide antibiotics)     Tigan [trimethobenzamide]          Review of Systems     Constitutional: Negative    HENT: Negative  Eyes: Negative  Respiratory: Negative  Cardiovascular: Negative  Musculoskeletal: HPI  Skin: Negative  Neurological: Negative  Hematological: Negative  Endocrine: Negative      Physical Exam    Vitals:    10/17/17 1552   BP: 138/76   Pulse: 81     Physical Examination:Right shoulder-very tender anteriorly and laterally and over the greater tuberosity. Surgical scars present. Forward flexion 90°, abduction 60°. Scaption 60°, internal rotation to sacrum. External rotation 45°. Positive impingement signs.     Skin-clear  General appearance -  well appearing, and in no distress  Mental status - awake  Neck - supple  Chest -  symmetric air entry  Heart - normal rate   Abdomen - soft      Assessment/Plan   Subdeltoid bursitis of right shoulder joint  -     Large Joint Aspiration/Injection        Subacromial injection with 80 mg Kenalog, 3 cc lidocaine.    This note was dictated using voice recognition software and may contain grammatical errors.

## 2017-10-17 NOTE — PROCEDURES
Large Joint Aspiration/Injection  Date/Time: 10/17/2017 5:08 PM  Performed by: CLAUDIA ELIZABETH JR  Authorized by: CLAUDIA ELIZABETH JR     Consent Done?:  Yes (Verbal)  Indications:  Pain  Procedure site marked: Yes    Timeout: Prior to procedure the correct patient, procedure, and site was verified      Location:  Shoulder  Site:  R subacromial bursa  Prep: Patient was prepped and draped in usual sterile fashion    Ultrasonic Guidance for needle placement: No  Needle size:  25 G  Approach:  Lateral  Medications:  80 mg triamcinolone acetonide 40 mg/mL  Patient tolerance:  Patient tolerated the procedure well with no immediate complications

## 2017-11-06 ENCOUNTER — OFFICE VISIT (OUTPATIENT)
Dept: SURGERY | Facility: CLINIC | Age: 77
End: 2017-11-06
Payer: MEDICARE

## 2017-11-06 VITALS
SYSTOLIC BLOOD PRESSURE: 138 MMHG | DIASTOLIC BLOOD PRESSURE: 76 MMHG | WEIGHT: 205 LBS | BODY MASS INDEX: 32.95 KG/M2 | HEIGHT: 66 IN

## 2017-11-06 DIAGNOSIS — R10.84 GENERALIZED ABDOMINAL PAIN: Primary | ICD-10-CM

## 2017-11-06 DIAGNOSIS — K66.0 INTRA-ABDOMINAL ADHESIONS: ICD-10-CM

## 2017-11-06 PROCEDURE — 99204 OFFICE O/P NEW MOD 45 MIN: CPT | Mod: ,,, | Performed by: SURGERY

## 2017-11-06 RX ORDER — GLIMEPIRIDE 1 MG/1
TABLET ORAL
COMMUNITY
Start: 2017-10-23 | End: 2018-08-30

## 2017-11-06 RX ORDER — ENALAPRIL MALEATE 2.5 MG/1
2.5 TABLET ORAL DAILY
COMMUNITY

## 2017-11-06 RX ORDER — AMITRIPTYLINE HYDROCHLORIDE 10 MG/1
TABLET, FILM COATED ORAL
COMMUNITY
Start: 2017-10-23 | End: 2018-01-09 | Stop reason: ALTCHOICE

## 2017-11-06 RX ORDER — CEPHALEXIN 250 MG/1
CAPSULE ORAL
COMMUNITY
Start: 2017-10-23 | End: 2018-01-09 | Stop reason: ALTCHOICE

## 2017-11-06 RX ORDER — OMEPRAZOLE 20 MG/1
20 CAPSULE, DELAYED RELEASE ORAL DAILY
COMMUNITY
End: 2018-01-09 | Stop reason: ALTCHOICE

## 2017-11-06 NOTE — PROGRESS NOTES
Subjective:       Patient ID: Johanny Hull is a 77 y.o. female.    Chief Complaint: Other (Self referred for abdominal adhesions)      HPI:  Patient comes in to be evaluated for intra-abdominal adhesions and generalized abdominal pain. Patient has a very long history of abdominal problems. She's had multiple surgeries mostly for lyses of adhesions and resulting pain. Patient also had hernia repairs with mesh. Most of these surgeries were in the 80s and 90s. Patient brought operative reports with her and I reviewed all of those.    At this time patient complains of abdominal pain which occurs immediately after eating. The pain eventually passes but not completely. Patient feels that her food is getting stuck on her adhesions. Patient has not had any weight loss. Patient did have a workup done fairly recently which included 2 CAT scans and endoscopy. Everything was negative.    Patient is quite realistic about her situation is not necessarily looking to have surgery. Patient wanted to establish with me so would know her history. She inquired about doing a possible small bowel follow-through to see if there is a point of obstruction. I agreed with her and that and ordered the x-ray. I held a long discussion with the patient about the situation and possible expectations.    Past Medical History:   Diagnosis Date    Arthritis     Cataract     Diabetes mellitus     Diabetes mellitus, type 2     GERD (gastroesophageal reflux disease)     Hyperlipidemia     Hypertension     Pacemaker     Severe low back pain     Subdeltoid bursitis of right shoulder joint     Thyroid disease      Past Surgical History:   Procedure Laterality Date    ABDOMINAL ADHESION SURGERY  12/28/1987        APPENDECTOMY      CARDIAC PACEMAKER PLACEMENT      10 /2016    CATARACT EXTRACTION      CERVICAL FUSION      CHOLECYSTECTOMY      CORONARY STENT PLACEMENT  x3    may 2015, aug 2015    EYE SURGERY      RIGHT    FOOT  SURGERY      x 2 LEFT    HYSTERECTOMY      VAGINAL    JOINT REPLACEMENT      R knee, L knee, R hip    LAMINECTOMY LUMBAR SPLINE W/ PLACEMENT SPINAL CORD STIMULATOR      LUMBAR LAMINECTOMY      x 6    SHOULDER SURGERY      ROTOR CUFF SU    SMALL INTESTINE SURGERY      3 blockages, adhesions    TOTAL HIP ARTHROPLASTY Right     TOTAL KNEE ARTHROPLASTY Bilateral      Review of patient's allergies indicates:   Allergen Reactions    Cephalosporins     Ciprofloxacin     Cleocin [clindamycin hcl]     Clindamycin     Darvon [propoxyphene]     Demerol [meperidine]     Doxycycline     Erythromycin     Macrodantin [nitrofurantoin macrocrystalline]     Nitrofurantoin     Norco [hydrocodone-acetaminophen]     Other omega-3s Nausea And Vomiting     Cephabid (ABX)  Cepabid    Percocet [oxycodone-acetaminophen]     Stadol [butorphanol tartrate]     Sulfa (sulfonamide antibiotics)     Tigan [trimethobenzamide]      Medication List with Changes/Refills   Current Medications    ACETAMINOPHEN (TYLENOL) 500 MG TABLET    Take 500 mg by mouth every 6 (six) hours as needed.      ACETAMINOPHEN-CODEINE 300-60MG (TYLENOL #4) 300-60 MG TAB    TK 1 T PO  Q 8 H PRN    AMITRIPTYLINE (ELAVIL) 10 MG TABLET        BUTALBITAL-ACETAMINOP-CAF-COD -09-30 MG CAP    Take 1 tablet by mouth 3 (three) times daily as needed.    CEPHALEXIN (KEFLEX) 250 MG CAPSULE        DICYCLOMINE (BENTYL) 20 MG TABLET    Take 1 tablet by mouth 2 (two) times daily.    DIPHENHYDRAMINE-ACETAMINOPHEN 12.5-325 MG TAB    Take 1 tablet by mouth daily as needed.    ENALAPRIL (VASOTEC) 2.5 MG TABLET    Take 2.5 mg by mouth once daily.    FOLIC ACID (FOLVITE) 1 MG TABLET    Take 1 mg by mouth once daily.      GLIMEPIRIDE (AMARYL) 1 MG TABLET        LANSOPRAZOLE (PREVACID) 30 MG CAPSULE    Take 30 mg by mouth once daily.      LEVOTHYROXINE (SYNTHROID) 25 MCG TABLET    Take 25 mcg by mouth once daily.      METFORMIN (GLUCOPHAGE) 500 MG TABLET    Take 500 mg  by mouth daily with breakfast.     METOPROLOL SUCCINATE (TOPROL-XL) 25 MG 24 HR TABLET    Take 25 mg by mouth once daily.      MUPIROCIN (BACTROBAN) 2 % OINTMENT    As directed    NORTRIPTYLINE (PAMELOR) 10 MG CAPSULE    Take 10 mg by mouth every evening.    OMEPRAZOLE (PRILOSEC) 20 MG CAPSULE    Take 20 mg by mouth once daily.    ONDANSETRON (ZOFRAN) 4 MG TABLET    Take 1 tablet by mouth daily as needed.    PANTOPRAZOLE (PROTONIX) 40 MG TABLET    Take 40 mg by mouth once daily.    PHENYLTOLOXAMINE-ACETAMINOPHEN  MG PER TABLET    Take 1 tablet by mouth every 4 (four) hours as needed for Pain.    PRAVASTATIN (PRAVACHOL) 10 MG TABLET    Take 10 mg by mouth once daily.      PREDNISONE (DELTASONE) 2.5 MG TABLET    TAKE 2 TABLETS(5 MG) BY MOUTH EVERY DAY    RANITIDINE (ZANTAC) 75 MG TABLET    Take 75 mg by mouth 2 (two) times daily.      RIVAROXABAN (XARELTO) 20 MG TAB    Take 20 mg by mouth daily with dinner or evening meal.    TRAMADOL (ULTRAM) 50 MG TABLET    Take 1 tablet (50 mg total) by mouth 3 (three) times daily as needed for Pain.     Family History   Problem Relation Age of Onset    Cancer Mother      LIVER    Stroke Father      Social History     Social History    Marital status:      Spouse name: N/A    Number of children: N/A    Years of education: N/A     Social History Main Topics    Smoking status: Never Smoker    Smokeless tobacco: Never Used    Alcohol use No    Drug use: No    Sexual activity: Not Currently     Other Topics Concern    None     Social History Narrative    None         Review of Systems   Constitutional: Negative for appetite change, chills, fever and unexpected weight change.   HENT: Negative for hearing loss, rhinorrhea, sore throat and voice change.    Eyes: Negative for photophobia and visual disturbance.   Respiratory: Negative for cough, choking and shortness of breath.    Cardiovascular: Negative for chest pain, palpitations and leg swelling.    Gastrointestinal: Positive for abdominal pain, nausea and vomiting. Negative for blood in stool, constipation and diarrhea.   Endocrine: Negative for cold intolerance, heat intolerance, polydipsia and polyuria.   Musculoskeletal: Negative for arthralgias, back pain, joint swelling and neck stiffness.   Skin: Negative for color change, pallor and rash.   Neurological: Negative for dizziness, seizures, syncope and headaches.   Hematological: Negative for adenopathy. Does not bruise/bleed easily.   Psychiatric/Behavioral: Negative for agitation, behavioral problems and confusion.       Objective:      Physical Exam   Constitutional: She appears well-developed and well-nourished.  Non-toxic appearance. No distress.   HENT:   Head: Normocephalic and atraumatic. Head is without abrasion and without laceration.   Right Ear: External ear normal.   Left Ear: External ear normal.   Nose: Nose normal.   Mouth/Throat: Oropharynx is clear and moist.   Eyes: EOM are normal. Pupils are equal, round, and reactive to light.   Neck: Trachea normal. No tracheal deviation and normal range of motion present. No thyroid mass and no thyromegaly present.   Cardiovascular: Normal rate and regular rhythm.    Pulmonary/Chest: Effort normal. No accessory muscle usage. No tachypnea. No respiratory distress.   Abdominal: Soft. Normal appearance and bowel sounds are normal. She exhibits no distension and no mass. There is no hepatosplenomegaly. There is no tenderness. There is no tenderness at McBurney's point and negative Isabel's sign. No hernia.   Status of postoperative changes in abdominal wall. No hernias are appreciated. Diffuse subjective tenderness and no objective tenderness   Lymphadenopathy:     She has no cervical adenopathy.     She has no axillary adenopathy.        Right: No inguinal adenopathy present.        Left: No inguinal adenopathy present.   Neurological: She is alert. Coordination and gait normal.   Skin: Skin is warm  and intact.   Psychiatric: She has a normal mood and affect. Her speech is normal and behavior is normal.       Assessment/Plan:   Generalized abdominal pain  -     FL Small Bowel Follow Through    Intra-abdominal adhesions  -     FL Small Bowel Follow Through

## 2017-11-17 ENCOUNTER — TELEPHONE (OUTPATIENT)
Dept: SURGERY | Facility: CLINIC | Age: 77
End: 2017-11-17

## 2018-01-09 ENCOUNTER — OFFICE VISIT (OUTPATIENT)
Dept: ORTHOPEDICS | Facility: CLINIC | Age: 78
End: 2018-01-09
Payer: MEDICARE

## 2018-01-09 VITALS
HEART RATE: 64 BPM | SYSTOLIC BLOOD PRESSURE: 130 MMHG | DIASTOLIC BLOOD PRESSURE: 70 MMHG | WEIGHT: 205 LBS | BODY MASS INDEX: 32.95 KG/M2 | HEIGHT: 66 IN

## 2018-01-09 DIAGNOSIS — G56.03 BILATERAL CARPAL TUNNEL SYNDROME: Primary | ICD-10-CM

## 2018-01-09 PROCEDURE — 20526 THER INJECTION CARP TUNNEL: CPT | Mod: RT,,, | Performed by: ORTHOPAEDIC SURGERY

## 2018-01-09 PROCEDURE — 99214 OFFICE O/P EST MOD 30 MIN: CPT | Mod: 25,,, | Performed by: ORTHOPAEDIC SURGERY

## 2018-01-09 RX ORDER — TRIAMCINOLONE ACETONIDE 40 MG/ML
40 INJECTION, SUSPENSION INTRA-ARTICULAR; INTRAMUSCULAR
Status: DISCONTINUED | OUTPATIENT
Start: 2018-01-09 | End: 2018-01-09 | Stop reason: HOSPADM

## 2018-01-09 RX ADMIN — TRIAMCINOLONE ACETONIDE 40 MG: 40 INJECTION, SUSPENSION INTRA-ARTICULAR; INTRAMUSCULAR at 04:01

## 2018-01-09 NOTE — PROCEDURES
R first doral compartmentTendon Sheath  Date/Time: 1/9/2018 4:45 PM  Performed by: CLAUDIA ELIZABETH JR  Authorized by: CLAUDIA ELIZABETH JR     Consent Done?: Yes (Verbal)  Timeout: prior to procedure the correct patient, procedure, and site was verified    Indications:  Pain  Site marked: the procedure site was marked    Timeout: prior to procedure the correct patient, procedure, and site was verified    Location:  Wrist (Carpal canal, right wrist)  Prep: patient was prepped and draped in usual sterile fashion    Ultrasonic guidance for needle placement?: No    Needle size:  25 G  Approach:  Dorsal  Medications:  40 mg triamcinolone acetonide 40 mg/mL  Patient tolerance:  Patient tolerated the procedure well with no immediate complications

## 2018-01-09 NOTE — PROGRESS NOTES
Subjective:       Chief Complaint    Chief Complaint   Patient presents with    Hand Pain     NC: bilat hands go numb, tingly, and throb. Started [id number]. Had x-rays at [facility] on hands on 12/13/17. NO injury known       HPI  Johanny Hull is a 77 y.o.  female who presents With tingling and numbness in both hands following a fall at which time she injured her right shoulder and elbow. She did not experience neck pain at that time. Days of the cervical spine showed diffuse degenerative disease as expected. X-rays of her hand show no evidence of fracture or dislocation. Negative all appearance was noted. Some degenerative changes in the carpal bones.      Past History  Past Medical History:   Diagnosis Date    Arthritis     Cataract     Diabetes mellitus     Diabetes mellitus, type 2     GERD (gastroesophageal reflux disease)     Hyperlipidemia     Hypertension     Pacemaker     Severe low back pain     Subdeltoid bursitis of right shoulder joint     Thyroid disease      Past Surgical History:   Procedure Laterality Date    ABDOMINAL ADHESION SURGERY  12/28/1987        APPENDECTOMY      CARDIAC PACEMAKER PLACEMENT      10 /2016    CATARACT EXTRACTION      CERVICAL FUSION      CHOLECYSTECTOMY      CORONARY STENT PLACEMENT  x3    may 2015, aug 2015    EYE SURGERY      RIGHT    FOOT SURGERY      x 2 LEFT    HYSTERECTOMY      VAGINAL    JOINT REPLACEMENT      R knee, L knee, R hip    LAMINECTOMY LUMBAR SPLINE W/ PLACEMENT SPINAL CORD STIMULATOR      LUMBAR LAMINECTOMY      x 6    SHOULDER SURGERY      ROTOR CUFF SU    SMALL INTESTINE SURGERY      3 blockages, adhesions    TOTAL HIP ARTHROPLASTY Right     TOTAL KNEE ARTHROPLASTY Bilateral      Social History     Social History    Marital status:      Spouse name: N/A    Number of children: N/A    Years of education: N/A     Occupational History    Not on file.     Social History Main Topics    Smoking status: Never  Smoker    Smokeless tobacco: Never Used    Alcohol use No    Drug use: No    Sexual activity: Not Currently     Other Topics Concern    Not on file     Social History Narrative    No narrative on file         Medications  Current Outpatient Prescriptions   Medication Sig    acetaminophen (TYLENOL) 500 MG tablet Take 500 mg by mouth every 6 (six) hours as needed.      acetaminophen-codeine 300-60mg (TYLENOL #4) 300-60 mg Tab TK 1 T PO  Q 8 H PRN    dicyclomine (BENTYL) 20 mg tablet Take 1 tablet by mouth 2 (two) times daily.    enalapril (VASOTEC) 2.5 MG tablet Take 2.5 mg by mouth once daily.    folic acid (FOLVITE) 1 MG tablet Take 1 mg by mouth once daily.      glimepiride (AMARYL) 1 MG tablet     levothyroxine (SYNTHROID) 25 MCG tablet Take 25 mcg by mouth once daily.      metformin (GLUCOPHAGE) 500 MG tablet Take 500 mg by mouth daily with breakfast.     metoprolol succinate (TOPROL-XL) 25 MG 24 hr tablet Take 25 mg by mouth once daily.      nortriptyline (PAMELOR) 10 MG capsule Take 10 mg by mouth every evening.    pantoprazole (PROTONIX) 40 MG tablet Take 40 mg by mouth once daily.    phenyltoloxamine-acetaminophen  mg per tablet Take 1 tablet by mouth every 4 (four) hours as needed for Pain.    pravastatin (PRAVACHOL) 10 MG tablet Take 10 mg by mouth once daily.      predniSONE (DELTASONE) 2.5 MG tablet TAKE 2 TABLETS(5 MG) BY MOUTH EVERY DAY    rivaroxaban (XARELTO) 20 mg Tab Take 20 mg by mouth daily with dinner or evening meal.    tramadol (ULTRAM) 50 mg tablet Take 1 tablet (50 mg total) by mouth 3 (three) times daily as needed for Pain.    mupirocin (BACTROBAN) 2 % ointment As directed    ondansetron (ZOFRAN) 4 MG tablet Take 1 tablet by mouth daily as needed.     No current facility-administered medications for this visit.        Allergies  Review of patient's allergies indicates:   Allergen Reactions    Cephalosporins     Ciprofloxacin     Cleocin [clindamycin hcl]      Clindamycin     Darvon [propoxyphene]     Demerol [meperidine]     Doxycycline     Erythromycin     Macrodantin [nitrofurantoin macrocrystalline]     Nitrofurantoin     Norco [hydrocodone-acetaminophen]     Other omega-3s Nausea And Vomiting     Cephabid (ABX)  Cepabid    Percocet [oxycodone-acetaminophen]     Stadol [butorphanol tartrate]     Sulfa (sulfonamide antibiotics)     Tigan [trimethobenzamide]          Review of Systems     Constitutional: Negative    HENT: Negative  Eyes: Negative  Respiratory: Negative  Cardiovascular: Negative  Musculoskeletal: HPI  Skin: Negative  Neurological: Negative  Hematological: Negative  Endocrine: Negative      Physical Exam    Vitals:    01/09/18 1556   BP: 130/70   Pulse: 64     Physical Examination:Examination both hands reveals moderate thenar atrophy bilaterally with a positive Tinel sign. She is able to make a complete fist. Good range of motion in flexion-extension of the wrist. Flexion is 70°, extension is 60°.     Skin-  General appearance -  well appearing, and in no distress  Mental status - awake  Neck - supple  Chest -  symmetric air entry  Heart - normal rate   Abdomen - soft      Assessment/Plan   Bilateral carpal tunnel syndrome      She was injected into the carpal canal on the right with 20 mg Kenalog and 2-1/2 cc of lidocaine. She is advised to wear bilateral wrist splints at night.      This note was dictated using voice recognition software and may contain grammatical errors.

## 2018-02-16 DIAGNOSIS — G43.909 MIGRAINE WITHOUT STATUS MIGRAINOSUS, NOT INTRACTABLE, UNSPECIFIED MIGRAINE TYPE: ICD-10-CM

## 2018-02-16 DIAGNOSIS — M47.22 OSTEOARTHRITIS OF SPINE WITH RADICULOPATHY, CERVICAL REGION: ICD-10-CM

## 2018-02-16 DIAGNOSIS — M79.7 FIBROMYALGIA: ICD-10-CM

## 2018-02-16 DIAGNOSIS — M06.9 RHEUMATOID ARTHRITIS OF HAND, UNSPECIFIED LATERALITY, UNSPECIFIED RHEUMATOID FACTOR PRESENCE: ICD-10-CM

## 2018-02-16 RX ORDER — TRAMADOL HYDROCHLORIDE 50 MG/1
TABLET ORAL
Qty: 90 TABLET | Refills: 0 | OUTPATIENT
Start: 2018-02-16

## 2018-02-19 DIAGNOSIS — M79.7 FIBROMYALGIA: ICD-10-CM

## 2018-02-19 DIAGNOSIS — M47.22 OSTEOARTHRITIS OF SPINE WITH RADICULOPATHY, CERVICAL REGION: ICD-10-CM

## 2018-02-19 DIAGNOSIS — M06.9 RHEUMATOID ARTHRITIS OF HAND, UNSPECIFIED LATERALITY, UNSPECIFIED RHEUMATOID FACTOR PRESENCE: ICD-10-CM

## 2018-02-19 DIAGNOSIS — G43.909 MIGRAINE WITHOUT STATUS MIGRAINOSUS, NOT INTRACTABLE, UNSPECIFIED MIGRAINE TYPE: ICD-10-CM

## 2018-02-19 NOTE — TELEPHONE ENCOUNTER
Spoke to the patient and explained her appointment with Shaji would be cancelled. This was booked incorrectly and the patient thought the doctor's covered for each other. The patient has had recent xrays with her PCP and injections into her hand by Ortho. In spite of this-she has numbness in both hands. She does not want to wait until August to be seen. She has not seen neuro. Please advise. CG

## 2018-02-20 RX ORDER — TRAMADOL HYDROCHLORIDE 50 MG/1
50 TABLET ORAL 3 TIMES DAILY PRN
Qty: 90 TABLET | Refills: 3 | OUTPATIENT
Start: 2018-02-20

## 2018-02-20 NOTE — TELEPHONE ENCOUNTER
----- Message from Jen Leal sent at 2/20/2018 11:29 AM CST -----  Contact: patient  Patient called requesting refill on tramadol (ULTRAM) 50 mg tablet send to [facility] pharmacy [phone]. Call back when script has been sent at [phone]

## 2018-02-21 NOTE — TELEPHONE ENCOUNTER
Spoke with patient regarding refill request. Informed Dr West will not fill medication due to not being evaluated since 2016. Informed patient that is on cancellation list for sooner appointment. Nurse asked patient if would be able to make last minute appointment. Patient stated yes if called in AM for afternoon appointment will be able to come.

## 2018-03-24 DIAGNOSIS — G43.909 MIGRAINE WITHOUT STATUS MIGRAINOSUS, NOT INTRACTABLE, UNSPECIFIED MIGRAINE TYPE: ICD-10-CM

## 2018-03-24 DIAGNOSIS — M54.6 PAIN IN THORACIC SPINE: ICD-10-CM

## 2018-03-24 DIAGNOSIS — M48.061 SPINAL STENOSIS AT L4-L5 LEVEL: ICD-10-CM

## 2018-03-24 DIAGNOSIS — M54.2 CERVICALGIA: ICD-10-CM

## 2018-03-24 DIAGNOSIS — M79.7 FIBROMYALGIA: ICD-10-CM

## 2018-03-24 DIAGNOSIS — M47.817 LUMBAR AND SACRAL ARTHRITIS: ICD-10-CM

## 2018-03-26 RX ORDER — PREDNISONE 2.5 MG/1
TABLET ORAL
Qty: 60 TABLET | Refills: 3 | Status: SHIPPED | OUTPATIENT
Start: 2018-03-26

## 2018-03-28 DIAGNOSIS — M48.061 SPINAL STENOSIS AT L4-L5 LEVEL: ICD-10-CM

## 2018-03-28 DIAGNOSIS — M47.817 LUMBAR AND SACRAL ARTHRITIS: ICD-10-CM

## 2018-03-28 DIAGNOSIS — G43.909 MIGRAINE WITHOUT STATUS MIGRAINOSUS, NOT INTRACTABLE, UNSPECIFIED MIGRAINE TYPE: ICD-10-CM

## 2018-03-28 DIAGNOSIS — M54.6 PAIN IN THORACIC SPINE: ICD-10-CM

## 2018-03-28 DIAGNOSIS — M54.2 CERVICALGIA: ICD-10-CM

## 2018-03-28 DIAGNOSIS — M79.7 FIBROMYALGIA: ICD-10-CM

## 2018-04-02 RX ORDER — PREDNISONE 2.5 MG/1
TABLET ORAL
Qty: 60 TABLET | Refills: 0 | OUTPATIENT
Start: 2018-04-02

## 2018-08-01 ENCOUNTER — HOSPITAL ENCOUNTER (EMERGENCY)
Facility: HOSPITAL | Age: 78
Discharge: HOME | End: 2018-08-01
Attending: EMERGENCY MEDICINE
Payer: MEDICARE

## 2018-08-01 VITALS
WEIGHT: 205 LBS | TEMPERATURE: 99 F | BODY MASS INDEX: 32.95 KG/M2 | HEART RATE: 76 BPM | RESPIRATION RATE: 18 BRPM | DIASTOLIC BLOOD PRESSURE: 53 MMHG | SYSTOLIC BLOOD PRESSURE: 106 MMHG | OXYGEN SATURATION: 96 % | HEIGHT: 66 IN

## 2018-08-01 DIAGNOSIS — S50.01XA CONTUSION OF RIGHT ELBOW: ICD-10-CM

## 2018-08-01 DIAGNOSIS — S50.02XA LEFT ELBOW CONTUSION: ICD-10-CM

## 2018-08-01 DIAGNOSIS — S70.01XA CONTUSION OF RIGHT HIP, INITIAL ENCOUNTER: ICD-10-CM

## 2018-08-01 DIAGNOSIS — S61.412A SKIN TEAR OF LEFT HAND WITHOUT COMPLICATION, INITIAL ENCOUNTER: Primary | ICD-10-CM

## 2018-08-01 DIAGNOSIS — S89.90XA KNEE INJURY: ICD-10-CM
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
## 2018-08-01 DIAGNOSIS — T14.8XXA MUSCLE STRAIN: ICD-10-CM

## 2018-08-01 PROCEDURE — 99283 EMERGENCY DEPT VISIT LOW MDM: CPT | Mod: 25

## 2018-08-01 PROCEDURE — 25000003 PHARM REV CODE 250: Performed by: EMERGENCY MEDICINE

## 2018-08-01 RX ORDER — ACETAMINOPHEN 500 MG
500 TABLET ORAL
Status: COMPLETED | OUTPATIENT
Start: 2018-08-01 | End: 2018-08-01

## 2018-08-01 RX ADMIN — ACETAMINOPHEN 500 MG: 500 TABLET, FILM COATED ORAL at 03:08

## 2018-08-01 NOTE — ED PROVIDER NOTES
"Encounter Date: 8/1/2018    SCRIBE #1 NOTE: Chelly BARNETT, aisha scribing for, and in the presence of, Dr. Mckeon.       History     Chief Complaint   Patient presents with    Fall     s/p mechanical fall, c/o Rt hip/knee pain (obvious bruising to Rt knee), bilat elbow abrasions, skin tear/lac to Lt hand (bleeding controlled by bandage in triage) - incident 1 hr PTA       Time seen by provider: 3:33 PM on 08/01/2018    Johanny Hull is a 78 y.o. female who presents to the ED with complaints of right knee pain, right hip pain, and left hand injury s/p a mechanical fall that occurred x1 hour PTA. Patient states that her foot "got stuck" on her crane causing her to fall forward, hitting her left hand on a dresser and falling onto her right side. She admits to being able to walk with assistance but with pain to her right hip. Last tetanus shot was after Vicky. She denies LOC and hitting her head. She is currently on blood thinners. She reports taking a Tylenol with Codeine immediately after the fall with mild improvements to pain. She has no other medical concerns or complaints at this moment. She denies onset of any other new symptoms. PMHx includes arthritis, HLD, thyroid disease, HTN, and DM. SHx includes hysterectomy, shoulder surgery, cervical fusion, foot surgery, joint replacement, right total hip arthroplasty, and bilateral total knee arthroplasty.       The history is provided by the patient.     Review of patient's allergies indicates:   Allergen Reactions    Cephalosporins     Ciprofloxacin     Cleocin [clindamycin hcl]     Clindamycin     Darvon [propoxyphene]     Demerol [meperidine]     Doxycycline     Erythromycin     Macrodantin [nitrofurantoin macrocrystalline]     Nitrofurantoin     Norco [hydrocodone-acetaminophen]     Other omega-3s Nausea And Vomiting     Cephabid (ABX)  Cepabid    Percocet [oxycodone-acetaminophen]     Stadol [butorphanol tartrate]     Sulfa (sulfonamide " antibiotics)     Tigan [trimethobenzamide]      Past Medical History:   Diagnosis Date    Arthritis     Cataract     Diabetes mellitus     Diabetes mellitus, type 2     GERD (gastroesophageal reflux disease)     Hyperlipidemia     Hypertension     Pacemaker     Severe low back pain     Subdeltoid bursitis of right shoulder joint     Thyroid disease      Past Surgical History:   Procedure Laterality Date    ABDOMINAL ADHESION SURGERY  12/28/1987        APPENDECTOMY      CARDIAC PACEMAKER PLACEMENT      10 /2016    CATARACT EXTRACTION      CERVICAL FUSION      CHOLECYSTECTOMY      CORONARY STENT PLACEMENT  x3    may 2015, aug 2015    EYE SURGERY      RIGHT    FOOT SURGERY      x 2 LEFT    HYSTERECTOMY      VAGINAL    JOINT REPLACEMENT      R knee, L knee, R hip    LAMINECTOMY LUMBAR SPLINE W/ PLACEMENT SPINAL CORD STIMULATOR      LUMBAR LAMINECTOMY      x 6    SHOULDER SURGERY      ROTOR CUFF SU    SMALL INTESTINE SURGERY      3 blockages, adhesions    TOTAL HIP ARTHROPLASTY Right     TOTAL KNEE ARTHROPLASTY Bilateral      Family History   Problem Relation Age of Onset    Cancer Mother         LIVER    Stroke Father      Social History   Substance Use Topics    Smoking status: Never Smoker    Smokeless tobacco: Never Used    Alcohol use No     Review of Systems   Constitutional: Negative for fever.   HENT: Negative for congestion.    Respiratory: Negative for wheezing.    Cardiovascular: Negative for chest pain.   Gastrointestinal: Negative for abdominal pain and nausea.   Musculoskeletal: Positive for arthralgias (right hip; right knee). Negative for back pain, joint swelling and neck pain.   Skin: Positive for wound (left hand). Negative for pallor and rash.   Neurological: Negative for syncope.   Hematological: Bruises/bleeds easily.   Psychiatric/Behavioral: Negative for confusion.       Physical Exam     Initial Vitals [08/01/18 1506]   BP Pulse Resp Temp SpO2   (!)  106/53 76 18 99 °F (37.2 °C) 96 %      MAP       --         Physical Exam    Nursing note and vitals reviewed.  Constitutional: She appears well-developed and well-nourished. She is not diaphoretic. No distress.   HENT:   Head: Normocephalic and atraumatic.   Eyes: Conjunctivae and EOM are normal.   Neck: Normal range of motion. Neck supple. No thyroid mass present.   Cardiovascular: Normal rate, regular rhythm and normal heart sounds. Exam reveals no gallop and no friction rub.    No murmur heard.  Pulses:       Radial pulses are 2+ on the right side, and 2+ on the left side.        Dorsalis pedis pulses are 2+ on the right side, and 2+ on the left side.        Posterior tibial pulses are 2+ on the right side, and 2+ on the left side.   Pulmonary/Chest: Breath sounds normal. She has no wheezes. She has no rhonchi. She has no rales.   Abdominal: Soft. Normal appearance and bowel sounds are normal. There is no tenderness.   Musculoskeletal:        Left hip: She exhibits tenderness.   Patient is able to stand but reports pain to left lateral hip.    Neurological: She is alert and oriented to person, place, and time. She has normal strength. No cranial nerve deficit or sensory deficit.   Skin: Skin is warm and dry. No rash noted. No erythema.   Diffuse skin tear over the dorsal aspect of the left hand with underlying bruising with pain to palpation. TKA scar over right knee with anterior overlying bruising noted. TKA scar over left knee.    Psychiatric: She has a normal mood and affect. Her speech is normal. Cognition and memory are normal.         ED Course   Procedures  Labs Reviewed - No data to display       Imaging Results          X-Ray Elbow Complete Bilateral (Final result)  Result time 08/01/18 16:38:44   Procedure changed from X-Ray Elbow Complete Right     Final result by Cody Huerta Jr., MD (08/01/18 16:38:44)                 Impression:      Mild DJD of both elbows.  Fracture or joint effusion is  not seen on either side.      Electronically signed by: Cody Huerta MD  Date:    08/01/2018  Time:    16:38             Narrative:    EXAMINATION:  XR ELBOW COMPLETE 3 VIEW BILATERAL    CLINICAL HISTORY:  pain;    TECHNIQUE:  AP, lateral, and oblique views of bilateral elbow were performed.    COMPARISON:  None    FINDINGS:  On the right no fracture is identified.  The anterior and posterior fat pad signs are negative.  There are degenerative changes identified in the trochlea joint.  No dislocation is seen.    On the left the anterior and posterior fat pad signs are negative.  No fracture is identified.  Mild degenerative changes are noted at the trochlea joint.                               X-Ray Hip 2 or 3 views Right (Final result)  Result time 08/01/18 16:40:37    Final result by Cody Huerta Jr., MD (08/01/18 16:40:37)                 Impression:      Prior right hip replacement. Prior laminectomies in the lumbar spine. Sclerosis at the right iliopectineal line may be secondary to stress orEarly Paget's disease.  Acute fracture or dislocation is not seen.      Electronically signed by: Cody Huerta MD  Date:    08/01/2018  Time:    16:40             Narrative:    EXAMINATION:  XR HIP 2 VIEW RIGHT    CLINICAL HISTORY:  Right hip contusion;    TECHNIQUE:  AP view of the pelvis and frog leg lateral view of the right hip were performed.    COMPARISON:  None    FINDINGS:  The patient has had a right hip replacement with metallic acetabular and femoral component in good position.  Dislocation or acute fracture of the right hip is not seen.  There is sclerosis of the right iliopectineal line.  This may be due to the peripheral additional stress of the hip prosthesis or Paget's disease.  Patient has had laminectomies with degenerative disc disease in the low lumbar spine noted.  Other fractures are not seen.                               X-Ray Knee 3 View Right (Final result)  Result time 08/01/18  16:36:03    Final result by Cody Huerta Jr., MD (08/01/18 16:36:03)                 Impression:      Edema of the whole leg.  Prior right knee joint replacement.      Electronically signed by: Cody Huerta MD  Date:    08/01/2018  Time:    16:36             Narrative:    EXAMINATION:  XR KNEE 3 VIEW RIGHT    CLINICAL HISTORY:  Unspecified injury of unspecified lower leg, initial encounter    TECHNIQUE:  AP, lateral, and Merchant views of the right knee were performed.    COMPARISON:  None    FINDINGS:  The patient has undergone a right knee joint replacement with metallic femoral and tibial components in good position.  Lucency around the prosthesis consistent with osteomyelitis or loosening is not demonstrated.  A joint effusion is not noted.  There is soft tissue swelling of the whole leg.                               X-Ray Hand 3 view Left (Final result)  Result time 08/01/18 16:37:21    Final result by Cody Huerta Jr., MD (08/01/18 16:37:21)                 Impression:      Soft tissue swelling of the dorsum of the hand.  Possible rotary subluxation of the navicular.  Mild DJD at the 1st and 2nd metacarpophalangeal joint and DIP joints of digits 2 and 3.      Electronically signed by: Cody Huerta MD  Date:    08/01/2018  Time:    16:37             Narrative:    EXAMINATION:  XR HAND COMPLETE 3 VIEW LEFT    CLINICAL HISTORY:  left knee contusion;.    TECHNIQUE:  PA, lateral, and oblique views of the left hand were performed.    COMPARISON:  None    FINDINGS:  Soft tissue swelling is noted of the dorsum of the hand.  A foreign body is not seen.  No fractures are identified.  Configuration of the navicular is consistent with rotary subluxation.  A fracture of the carpals is not seen.  The metacarpals and phalanges appear intact.  Mild osteoarthritic changes are noted at the 1st metacarpophalangeal joint and at the DIP joints of the index and middle fingers.                                  Medical Decision Making:   History:   Old Medical Records: I decided to obtain old medical records.  Clinical Tests:   Radiological Study: Reviewed and Ordered  ED Management:  This patient was interviewed and examined emergently.  Initial vital signs are stable.  She exhibits normal mentation and has a nonfocal neurologic exam.  There is no gross bony abnormalities or joint dysfunction notable on physical examination. Radiographs of her injury extremities were obtained and found to be negative for bony fracture or dislocation.  Patient was provided oral acetaminophen here.  She additionally has a skin tear on the top of her hand, but there is nothing to sew so a pressure dressing was applied and she will be educated about appropriate supportive care for this wound as well.  She is asked to follow up with her primary care doctor or orthopedic surgeon as soon as possible regarding ongoing pain or new symptoms. She is asked return to the ER for any new, concerning, or worsening symptoms.  Patient was agreeable with this plan for follow-up and she was discharged in stable condition. With regards to CT scan head, only the patient's age excludes the patient per [place] CT rules and she did not hit her head.  I do not think she warrants scanning and she had a stable period of ED observation.  She will be asked to additionally return immediately for any headache, vomiting or any neurologic symptoms that develop.            Scribe Attestation:   Scribe #1: I performed the above scribed service and the documentation accurately describes the services I performed. I attest to the accuracy of the note.      I, Dr. Zoltan Mckeon, personally performed the services described in this documentation. All medical record entries made by the scribe were at my direction and in my presence.  I have reviewed the chart and agree that the record reflects my personal performance and is accurate and complete. Zoltan Mckeon MD.  3:48 PM  08/01/2018             Clinical Impression:   The primary encounter diagnosis was Skin tear of left hand without complication, initial encounter. Diagnoses of Knee injury, Contusion of right elbow, Left elbow contusion, Muscle strain, Contusion of right hip, initial encounter, and Fall on same level from stumbling, initial encounter were also pertinent to this visit.      Disposition:   Disposition: Discharged  Condition: Stable                        Zoltan Mckeon MD  08/01/18 2224       Zoltan Mckeon MD  08/01/18 2220

## 2018-08-14 ENCOUNTER — HISTORICAL (OUTPATIENT)
Dept: ADMINISTRATIVE | Facility: HOSPITAL | Age: 78
End: 2018-08-14

## 2018-08-14 LAB
BASOPHILS NFR BLD: 0 K/UL (ref 0–0.2)
BASOPHILS NFR BLD: 0.5 %
BUN SERPL-MCNC: 17 MG/DL (ref 8–20)
CALCIUM SERPL-MCNC: 8 MG/DL (ref 7.7–10.4)
CHLORIDE: 100 MMOL/L (ref 98–110)
CO2 SERPL-SCNC: 24.8 MMOL/L (ref 22.8–31.6)
CREATININE: 0.9 MG/DL (ref 0.6–1.4)
EOSINOPHIL NFR BLD: 0.5 K/UL (ref 0–0.7)
EOSINOPHIL NFR BLD: 6.1 %
ERYTHROCYTE [DISTWIDTH] IN BLOOD BY AUTOMATED COUNT: 14.6 % (ref 11.7–14.9)
GLUCOSE: 138 MG/DL (ref 70–99)
GRAN #: 4.9 K/UL (ref 1.4–6.5)
GRAN%: 66.1 %
HCT VFR BLD AUTO: 27.1 % (ref 36–48)
HGB BLD-MCNC: 8.6 G/DL (ref 12–15)
IMMATURE GRANS (ABS): 0.1 K/UL (ref 0–1)
IMMATURE GRANULOCYTES: 0.7 %
LYMPH #: 1.1 K/UL (ref 1.2–3.4)
LYMPH%: 14.9 %
MCH RBC QN AUTO: 27.7 PG (ref 25–35)
MCHC RBC AUTO-ENTMCNC: 31.7 G/DL (ref 31–36)
MCV RBC AUTO: 87.1 FL (ref 79–98)
MONO #: 0.9 K/UL (ref 0.1–0.6)
MONO%: 11.7 %
NUCLEATED RBCS: 0 %
PLATELET # BLD AUTO: 272 K/UL (ref 140–440)
PMV BLD AUTO: 10 FL (ref 8.8–12.7)
POTASSIUM SERPL-SCNC: 4.6 MMOL/L (ref 3.5–5)
RBC # BLD AUTO: 3.11 M/UL (ref 3.5–5.5)
SODIUM: 133 MMOL/L (ref 134–144)
WBC # BLD AUTO: 7.4 K/UL (ref 5–10)

## 2018-08-15 LAB
HCT VFR BLD AUTO: 27.2 % (ref 36–48)
HGB BLD-MCNC: 8.4 G/DL (ref 12–15)
MCH RBC QN AUTO: 26.9 PG (ref 25–35)
MCHC RBC AUTO-ENTMCNC: 30.9 G/DL (ref 31–36)
MCV RBC AUTO: 87.2 FL (ref 79–98)
NUCLEATED RBCS: 0 %
PLATELET # BLD AUTO: 268 K/UL (ref 140–440)
RBC # BLD AUTO: 3.12 M/UL (ref 3.5–5.5)
WBC # BLD AUTO: 8.6 K/UL (ref 5–10)

## 2018-08-24 ENCOUNTER — TELEPHONE (OUTPATIENT)
Dept: ADMINISTRATIVE | Facility: CLINIC | Age: 78
End: 2018-08-24

## 2018-08-24 NOTE — TELEPHONE ENCOUNTER
Home Health SOC 08/17/2018 - 10/15/2018 with [facility] ([place]) - Dr. Silvino Meehan. Patient received SN, PT and OT services.

## 2018-08-30 ENCOUNTER — OFFICE VISIT (OUTPATIENT)
Dept: ORTHOPEDICS | Facility: CLINIC | Age: 78
End: 2018-08-30
Payer: MEDICARE

## 2018-08-30 VITALS — BODY MASS INDEX: 32.95 KG/M2 | HEIGHT: 66 IN | WEIGHT: 205 LBS

## 2018-08-30 DIAGNOSIS — Z98.890 POSTOPERATIVE STATE: ICD-10-CM

## 2018-08-30 DIAGNOSIS — S80.01XD TRAUMATIC HEMATOMA OF RIGHT KNEE, SUBSEQUENT ENCOUNTER: Primary | ICD-10-CM

## 2018-08-30 PROBLEM — S80.01XA TRAUMATIC HEMATOMA OF RIGHT KNEE: Status: ACTIVE | Noted: 2018-08-30

## 2018-08-30 PROCEDURE — 99024 POSTOP FOLLOW-UP VISIT: CPT | Mod: ,,, | Performed by: ORTHOPAEDIC SURGERY

## 2018-08-30 RX ORDER — CLINDAMYCIN HYDROCHLORIDE 300 MG/1
1 CAPSULE ORAL
COMMUNITY
Start: 2018-08-17

## 2018-08-30 RX ORDER — ACETAMINOPHEN AND CODEINE PHOSPHATE 300; 30 MG/1; MG/1
1 TABLET ORAL 2 TIMES DAILY
COMMUNITY

## 2018-08-30 NOTE — PROGRESS NOTES
Subjective:       Chief Complaint    Chief Complaint   Patient presents with    Post-op Evaluation     17 days.  DOS: 8/13/18, I&D of hematoma prepatellar bursa right knee.  DOI: 8/1/18, due to a trip & fall.  Patient reports she still has pain in the knee.  Also having pain in both lower leg areas.       HPI  Johanny Hull is a 78 y.o.  female who presents follow-up on contusion and traumatic hematoma to the prepatellar bursa, right knee. Doing well.      Past History  Past Medical History:   Diagnosis Date    Arthritis     Cataract     Diabetes mellitus     Diabetes mellitus, type 2     GERD (gastroesophageal reflux disease)     Hyperlipidemia     Hypertension     Pacemaker     Severe low back pain     Subdeltoid bursitis of right shoulder joint     Thyroid disease      Past Surgical History:   Procedure Laterality Date    ABDOMINAL ADHESION SURGERY  12/28/1987        APPENDECTOMY      CARDIAC PACEMAKER PLACEMENT      10 /2016    CATARACT EXTRACTION      CERVICAL FUSION      CHOLECYSTECTOMY      CORONARY STENT PLACEMENT  x3    may 2015, aug 2015    EYE SURGERY      RIGHT    FOOT SURGERY      x 2 LEFT    HYSTERECTOMY      VAGINAL    I&D of hematoma prepatellar bursa right knee  08/13/2018    JOINT REPLACEMENT      R knee, L knee, R hip    LAMINECTOMY LUMBAR SPLINE W/ PLACEMENT SPINAL CORD STIMULATOR      LUMBAR LAMINECTOMY      x 6    SHOULDER SURGERY      ROTOR CUFF SU    SMALL INTESTINE SURGERY      3 blockages, adhesions    TOTAL HIP ARTHROPLASTY Right     TOTAL KNEE ARTHROPLASTY Bilateral      Social History     Socioeconomic History    Marital status:      Spouse name: Not on file    Number of children: Not on file    Years of education: Not on file    Highest education level: Not on file   Social Needs    Financial resource strain: Not on file    Food insecurity - worry: Not on file    Food insecurity - inability: Not on file    Transportation needs -  medical: Not on file    Transportation needs - non-medical: Not on file   Occupational History    Not on file   Tobacco Use    Smoking status: Never Smoker    Smokeless tobacco: Never Used   Substance and Sexual Activity    Alcohol use: No    Drug use: No    Sexual activity: Not Currently   Other Topics Concern    Not on file   Social History Narrative    Not on file         Medications  Current Outpatient Medications   Medication Sig    acetaminophen (TYLENOL) 500 MG tablet Take 500 mg by mouth every 6 (six) hours as needed.      acetaminophen-codeine 300-30mg (TYLENOL-CODEINE #3) 300-30 mg Tab Take 1 tablet by mouth 2 (two) times daily.    acetaminophen/diphenhydramine (PERCOGESIC EXTRA STRENGTH ORAL) Take 350 mg by mouth 2 (two) times daily as needed.    clindamycin (CLEOCIN) 300 MG capsule Take 1 capsule by mouth.    dicyclomine (BENTYL) 20 mg tablet Take 1 tablet by mouth 3 (three) times daily.     enalapril (VASOTEC) 2.5 MG tablet Take 2.5 mg by mouth once daily.    folic acid (FOLVITE) 1 MG tablet Take 1 mg by mouth once daily.      levothyroxine (SYNTHROID) 25 MCG tablet Take 25 mcg by mouth once daily.      metformin (GLUCOPHAGE) 500 MG tablet Take 500 mg by mouth 2 (two) times daily with meals.     metoprolol succinate (TOPROL-XL) 25 MG 24 hr tablet Take 1 tablet by mouth 2 (two) times daily.    pantoprazole (PROTONIX) 40 MG tablet Take 40 mg by mouth once daily.    pravastatin (PRAVACHOL) 10 MG tablet Take 10 mg by mouth once daily.      predniSONE (DELTASONE) 2.5 MG tablet TAKE 2 TABLETS(5 MG) BY MOUTH EVERY DAY    ranitidine (ZANTAC) 150 MG tablet Take 150 mg by mouth nightly.    rivaroxaban (XARELTO) 20 mg Tab Take 20 mg by mouth daily with dinner or evening meal.    tramadol (ULTRAM) 50 mg tablet Take 1 tablet (50 mg total) by mouth 3 (three) times daily as needed for Pain.     No current facility-administered medications for this visit.        Allergies  Review of patient's  allergies indicates:   Allergen Reactions    Cephalosporins     Ciprofloxacin     Cleocin [clindamycin hcl]     Clindamycin     Darvon [propoxyphene]     Demerol [meperidine]     Doxycycline     Erythromycin     Macrodantin [nitrofurantoin macrocrystalline]     Nitrofurantoin     Norco [hydrocodone-acetaminophen]     Other omega-3s Nausea And Vomiting     Cephabid (ABX)  Cepabid    Percocet [oxycodone-acetaminophen]     Stadol [butorphanol tartrate]     Sulfa (sulfonamide antibiotics)     Tigan [trimethobenzamide]          Review of Systems     Constitutional: Negative    HENT: Negative  Eyes: Negative  Respiratory: Negative  Cardiovascular: Negative  Musculoskeletal: HPI  Skin: Negative  Neurological: Negative  Hematological: Negative  Endocrine: Negative      Physical Exam    There were no vitals filed for this visit.  Physical Examination: The prepatellar hematoma has resolved. Sutures are present from the surgical site. Inferior to the patella. Range of motion 0-90°.     Skin-clear  General appearance -  well appearing, and in no distress  Mental status - awake  Neck - supple  Chest -  symmetric air entry  Heart - normal rate   Abdomen - soft      Assessment/Plan   Traumatic hematoma of right knee, subsequent encounter    Postoperative state       Sutures removed. Complaining of discomfort along the anterior aspect of the tibia since her fall. There was no evidence of fracture of the knee or tibia. These problems will continue to improve.      This note was dictated using voice recognition software and may contain grammatical errors.

## 2018-09-10 LAB
HCT VFR BLD AUTO: 29.4 % (ref 36–48)
HGB BLD-MCNC: 9.6 G/DL (ref 12–15)
